# Patient Record
Sex: MALE | Race: WHITE | NOT HISPANIC OR LATINO | Employment: OTHER | URBAN - METROPOLITAN AREA
[De-identification: names, ages, dates, MRNs, and addresses within clinical notes are randomized per-mention and may not be internally consistent; named-entity substitution may affect disease eponyms.]

---

## 2017-10-25 PROBLEM — G45.9 TIA (TRANSIENT ISCHEMIC ATTACK): Status: ACTIVE | Noted: 2017-10-25

## 2017-10-26 PROBLEM — R51.9 HEADACHE: Status: ACTIVE | Noted: 2017-10-26

## 2017-10-27 PROBLEM — R51.9 HEADACHE: Status: RESOLVED | Noted: 2017-10-26 | Resolved: 2017-10-27

## 2017-10-27 PROBLEM — G45.9 TIA (TRANSIENT ISCHEMIC ATTACK): Status: RESOLVED | Noted: 2017-10-25 | Resolved: 2017-10-27

## 2017-11-27 ENCOUNTER — HOSPITAL ENCOUNTER (OUTPATIENT)
Dept: SLEEP MEDICINE | Facility: HOSPITAL | Age: 72
Discharge: HOME | End: 2017-11-27
Attending: INTERNAL MEDICINE
Payer: MEDICARE

## 2017-11-27 DIAGNOSIS — G47.33 OBSTRUCTIVE SLEEP APNEA SYNDROME: ICD-10-CM

## 2017-11-27 PROCEDURE — 95806 SLEEP STUDY UNATT&RESP EFFT: CPT

## 2018-01-17 PROBLEM — I48.19 PERSISTENT ATRIAL FIBRILLATION: Status: ACTIVE | Noted: 2018-01-17

## 2018-01-17 PROBLEM — R94.39 ABNORMAL MYOCARDIAL PERFUSION STUDY: Status: ACTIVE | Noted: 2018-01-17

## 2018-01-17 PROBLEM — I25.10 CAD IN NATIVE ARTERY: Status: ACTIVE | Noted: 2018-01-17

## 2018-01-17 PROBLEM — I20.9 ANGINA, CLASS II: Status: ACTIVE | Noted: 2018-01-17

## 2018-01-20 PROBLEM — J18.9 PNEUMONIA OF RIGHT LUNG DUE TO INFECTIOUS ORGANISM: Status: ACTIVE | Noted: 2018-01-20

## 2018-01-21 PROBLEM — J11.1 INFLUENZA: Status: ACTIVE | Noted: 2018-01-21

## 2018-01-22 PROBLEM — E03.9 HYPOTHYROID: Status: ACTIVE | Noted: 2018-01-22

## 2018-01-22 PROBLEM — N40.0 BPH (BENIGN PROSTATIC HYPERPLASIA): Status: ACTIVE | Noted: 2018-01-22

## 2018-05-15 PROBLEM — M12.811 ROTATOR CUFF ARTHROPATHY, RIGHT: Status: ACTIVE | Noted: 2018-05-15

## 2018-06-07 PROBLEM — Z96.619 S/P REVERSE TOTAL SHOULDER ARTHROPLASTY: Status: ACTIVE | Noted: 2018-06-07

## 2021-07-09 PROBLEM — R29.898 COMPLAINTS OF WEAKNESS OF LOWER EXTREMITY: Status: ACTIVE | Noted: 2021-07-09

## 2021-07-09 PROBLEM — E87.6 HYPOKALEMIA: Status: ACTIVE | Noted: 2021-07-09

## 2021-07-09 PROBLEM — M79.674 PAIN IN RIGHT TOE(S): Status: ACTIVE | Noted: 2021-07-09

## 2021-07-10 PROBLEM — S92.501A: Status: ACTIVE | Noted: 2021-07-09

## 2021-07-10 PROBLEM — R26.89 IMBALANCE: Status: ACTIVE | Noted: 2021-07-10

## 2021-07-11 PROBLEM — R42 DIZZINESS: Status: ACTIVE | Noted: 2021-07-11

## 2021-07-11 PROBLEM — I95.9 HYPOTENSION: Status: ACTIVE | Noted: 2021-07-11

## 2022-09-02 ENCOUNTER — OFFICE VISIT (OUTPATIENT)
Dept: URGENT CARE | Facility: CLINIC | Age: 77
End: 2022-09-02
Payer: MEDICARE

## 2022-09-02 VITALS
HEART RATE: 81 BPM | BODY MASS INDEX: 35.94 KG/M2 | RESPIRATION RATE: 20 BRPM | WEIGHT: 280 LBS | TEMPERATURE: 99 F | SYSTOLIC BLOOD PRESSURE: 97 MMHG | HEIGHT: 74 IN | OXYGEN SATURATION: 95 % | DIASTOLIC BLOOD PRESSURE: 60 MMHG

## 2022-09-02 DIAGNOSIS — S61.412A LACERATION OF LEFT HAND WITHOUT FOREIGN BODY, INITIAL ENCOUNTER: Primary | ICD-10-CM

## 2022-09-02 PROCEDURE — 99204 PR OFFICE/OUTPT VISIT, NEW, LEVL IV, 45-59 MIN: ICD-10-PCS | Mod: 25,S$GLB,, | Performed by: FAMILY MEDICINE

## 2022-09-02 PROCEDURE — 12001 LACERATION REPAIR: ICD-10-PCS | Mod: S$GLB,,, | Performed by: FAMILY MEDICINE

## 2022-09-02 PROCEDURE — 73130 X-RAY EXAM OF HAND: CPT | Mod: LT,S$GLB,, | Performed by: RADIOLOGY

## 2022-09-02 PROCEDURE — 12001 RPR S/N/AX/GEN/TRNK 2.5CM/<: CPT | Mod: S$GLB,,, | Performed by: FAMILY MEDICINE

## 2022-09-02 PROCEDURE — 99204 OFFICE O/P NEW MOD 45 MIN: CPT | Mod: 25,S$GLB,, | Performed by: FAMILY MEDICINE

## 2022-09-02 PROCEDURE — 73130 XR HAND COMPLETE 3 VIEW LEFT: ICD-10-PCS | Mod: LT,S$GLB,, | Performed by: RADIOLOGY

## 2022-09-02 RX ORDER — MUPIROCIN 20 MG/G
OINTMENT TOPICAL 2 TIMES DAILY
Qty: 30 G | Refills: 0 | Status: SHIPPED | OUTPATIENT
Start: 2022-09-02

## 2022-09-02 RX ORDER — PREDNISONE 10 MG/1
TABLET ORAL
COMMUNITY
Start: 2022-08-26

## 2022-09-02 RX ORDER — COLCHICINE 0.6 MG/1
TABLET ORAL
COMMUNITY
Start: 2022-08-26

## 2022-09-02 RX ORDER — CEPHALEXIN 250 MG/1
250 CAPSULE ORAL 4 TIMES DAILY
Qty: 40 CAPSULE | Refills: 0 | Status: SHIPPED | OUTPATIENT
Start: 2022-09-02 | End: 2022-09-12

## 2022-09-02 RX ORDER — GABAPENTIN 100 MG/1
200 CAPSULE ORAL 3 TIMES DAILY
COMMUNITY
Start: 2022-03-07

## 2022-09-02 NOTE — PROCEDURES
Laceration Repair    Date/Time: 9/2/2022 4:30 PM  Performed by: Juma Antonio MD  Authorized by: Juma Antonio MD   Body area: upper extremity  Location details: left hand  Laceration length: 2.5 cm  Foreign bodies: no foreign bodies  Tendon involvement: none  Nerve involvement: none  Vascular damage: no  Anesthesia: local infiltration    Anesthesia:  Local Anesthetic: lidocaine 1% without epinephrine  Anesthetic total: 3 mL  Irrigation solution: saline  Irrigation method: syringe  Amount of cleaning: standard  Debridement: none  Degree of undermining: none  Skin closure: 5-0 Prolene  Number of sutures: 6  Technique: simple  Approximation: close  Approximation difficulty: simple  Dressing: 4x4 sterile gauze, antibiotic ointment and dressing applied  Patient tolerance: Patient tolerated the procedure well with no immediate complications  Comments: Td up to date.

## 2022-09-02 NOTE — PATIENT INSTRUCTIONS
Please drink plenty of fluids.  Please get plenty of rest.  Please return here or go to the Emergency Department for any concerns or worsening of condition.  If you were prescribed antibiotics, please take them to completion.  If you were prescribed a narcotic medication, do not drive or operate heavy equipment or machinery while taking these medications.      If not allergic, please take over the counter Tylenol (Acetaminophen) and/or Motrin (Ibuprofen) as directed for control of pain and/or fever.    Your tetanus was brought up to date if needed.    Keep wound clean and dry.  You may use a plastic bag to keep area dry while showering    Clean wound once or twice a day with soap and water, then apply antibiotic ointment and redress wound.    Follow up here or your primary care doctor for suture removal in    7 - 10 days for lacerations on your body    5 - 7 days for lacerations on your face.    Please follow up with your primary care doctor or specialist as needed.  Jake Rivera MD  [phone]    You must understand that you have received treatment at an Urgent Care facility only, and that you may be  released before all of your medical problems are known or treated. Urgent Care facilities are not equipped to  handle life threatening emergencies. It is recommended that you seek care at an Emergency Department for  further evaluation of worsening or concerning symptoms, or possibly life threatening conditions as  Discussed.    Laceration: All Closures  A laceration is a cut through the skin. This will usually require stitches (sutures) or staples if it is deep. Minor cuts may be treated with a surgical tape closure or skin glue.    Home care  Your healthcare provider may prescribe an antibiotic. This is to help prevent infection. Follow all instructions for taking this medicine. Take the medicine every day until it is gone or you are told to stop. You should not have any left over.  The healthcare provider  may prescribe medicines for pain. Follow instructions for taking them.  Follow the healthcare providers instructions on how to care for the cut.  Keep the wound clean and dry. Do not get the wound wet until you are told it is okay to do so. If the area gets wet, gently pat it dry with a clean cloth. Replace the wet bandage with a dry one.  If a bandage was applied and it becomes wet or dirty, replace it. Otherwise, leave it in place for the first 24 hours.  Caring for sutures or staples: Once you no longer need to keep them dry, clean the wound daily. First, remove the bandage. Then wash the area gently with soap and warm water, or as directed by the health care provider. Use a wet cotton swab to loosen and remove any blood or crust that forms. After cleaning, apply a thin layer of antibiotic ointment if advised. Then put on a new bandage unless you are told not to.  Caring for skin glue: Dont put apply liquid, ointment, or cream on the wound while the glue is in place. Avoid activities that cause heavy sweating. Protect the wound from sunlight. Do not scratch, rub, or pick at the adhesive film. Do not place tape directly over the film. The glue should peel off within 5 to 10 days.   Caring for surgical tape: Keep the area dry. If it gets wet, blot it dry with a clean towel. Surgical tape usually falls off within 7 to 10 days. If it has not fallen off after 10 days, you can take it off yourself. Put mineral oil or petroleum jelly on a cotton ball and gently rub the tape until it is removed.  Once you can get the wound wet, you may shower as usual but do not soak the wound in water (no tub baths or swimming)  Even with proper treatment, a wound infection may sometimes occur. Check the wound daily for signs of infection listed below.  Scalp wounds  During the first two days, you may carefully rinse your hair in the shower to remove blood, glass or dirt particles. After two days, you may shower and shampoo your hair  normally. Do not soak your scalp in the tub or go swimming until the stitches or staples have been removed. Talk with your healthcare provider before applying any antibiotic ointment to the wound.  Mouth wounds  Eat soft foods to reduce pain. If the cut is inside of your mouth, clean by rinsing after each meal and at bedtime with a mixture of equal parts water and hydrogen peroxide (do not swallow!). Or, you can use a cotton swab to directly apply hydrogen peroxide onto the cut. Mouth wounds can be painful when eating. You may use an over-the-counter local numbing solution for pain relief. If this is not available, you may use any numbing solution intended for teething babies. You may apply this directly to the sores with a cotton-tip swab or with your finger.  Follow-up care  Follow up with your healthcare provider as advised. Ask your healthcare provider how long sutures should be left in place. Be sure to return for suture removal as directed. If dissolving stitches were used in the mouth, these should fall out or dissolve without the need for removal. If tape closures were used, remove them yourself when your provider recommends if they have not fallen off on their own. If skin glue was used, the film will wear off by itself.  When to seek medical advice  Call your healthcare provider right away if any of these occur:  Wound bleeding not controlled by direct pressure  Signs of infection, including increasing pain in the wound, increasing wound redness or swelling, or pus or bad odor coming from the wound  Fever of 100.4°F (38.ºC) or higher or as directed by your healthcare provider  Stitches or staples come apart or fall out or surgical tape falls off before 7 days  Wound edges re-open  Wound changes colors  Numbness around the wound   Decreased movement around the injured area  Date Last Reviewed: 6/14/2015  © 5223-2728 [employer]. [address] [place] [address], [place] [address]. All rights reserved.  This information is not intended as a substitute for professional medical care. Always follow your healthcare professional's instructions.

## 2022-09-02 NOTE — PROGRESS NOTES
"Subjective:       Patient ID: Mohan Chong Sr. is a 76 y.o. male.    Vitals:  height is 6' 2" (1.88 m) and weight is 127 kg (280 lb). His oral temperature is 98.5 °F (36.9 °C). His blood pressure is 97/60 and his pulse is 81. His respiration is 20 and oxygen saturation is 95%.     Chief Complaint: Laceration (Left hand)    Patient was cleaning some blades for  and it got away from him and got him on his left hand.     Laceration   The incident occurred less than 1 hour ago. The laceration is located on the Left hand. The laceration mechanism was a metal edge. The pain is at a severity of 5/10. The pain is mild. The pain has been Constant since onset. It is unknown if a foreign body is present. His tetanus status is UTD.     Constitution: Negative.   HENT: Negative.     Cardiovascular: Negative.    Eyes: Negative.    Respiratory: Negative.     Gastrointestinal: Negative.    Endocrine: negative.   Genitourinary: Negative.    Musculoskeletal: Negative.    Skin: Negative.  Positive for laceration.   Allergic/Immunologic: Negative.    Neurological: Negative.    Hematologic/Lymphatic: Negative.    Psychiatric/Behavioral: Negative.       Objective:      Physical Exam   Constitutional: He is oriented to person, place, and time. He appears well-developed. He is cooperative.  Non-toxic appearance. He does not appear ill. No distress.   HENT:   Head: Normocephalic and atraumatic. Head is without abrasion, without contusion and without laceration.   Ears:   Right Ear: Hearing, tympanic membrane, external ear and ear canal normal. No hemotympanum.   Left Ear: Hearing, tympanic membrane, external ear and ear canal normal. No hemotympanum.   Nose: Nose normal. No mucosal edema, rhinorrhea or nasal deformity. No epistaxis. Right sinus exhibits no maxillary sinus tenderness and no frontal sinus tenderness. Left sinus exhibits no maxillary sinus tenderness and no frontal sinus tenderness.   Mouth/Throat: Uvula is midline, " oropharynx is clear and moist and mucous membranes are normal. No trismus in the jaw. Normal dentition. No uvula swelling. No posterior oropharyngeal erythema.   Eyes: Conjunctivae, EOM and lids are normal. Pupils are equal, round, and reactive to light. Right eye exhibits no discharge. Left eye exhibits no discharge. No scleral icterus.   Neck: Trachea normal and phonation normal. Neck supple. No tracheal deviation present. No neck rigidity present. No spinous process tenderness present. No muscular tenderness present.   Cardiovascular: Normal rate, regular rhythm, normal heart sounds and normal pulses.   Pulmonary/Chest: Effort normal and breath sounds normal. No respiratory distress.   Abdominal: Normal appearance and bowel sounds are normal. He exhibits no distension and no mass. Soft. There is no abdominal tenderness.   Musculoskeletal: Normal range of motion.         General: No deformity. Normal range of motion.      Left hand: He exhibits laceration.        Hands:    Neurological: He is alert and oriented to person, place, and time. He has normal strength. No cranial nerve deficit or sensory deficit. He exhibits normal muscle tone. He displays no seizure activity. Coordination normal. GCS eye subscore is 4. GCS verbal subscore is 5. GCS motor subscore is 6.   Skin: Skin is warm, dry, intact, not diaphoretic and not pale. Capillary refill takes less than 2 seconds. Lacerations - upper ext.:  left handNo abrasion, No burn, No bruising and No ecchymosis   Psychiatric: His speech is normal and behavior is normal. Judgment and thought content normal.   Nursing note and vitals reviewed.      Type of Interpretation: ED Physician (Independently Interpreted).  Radiology Procedure Done: Left Hand.  Interpretation: No fx, foreign body in 5th finger (unrelated to laceration injury.)          Assessment:       1. Laceration of left hand without foreign body, initial encounter            Plan:         Laceration of left  hand without foreign body, initial encounter  -     XR HAND COMPLETE 3 VIEW LEFT; Future; Expected date: 09/02/2022  -     cephALEXin (KEFLEX) 250 MG capsule; Take 1 capsule (250 mg total) by mouth 4 (four) times daily. for 10 days  Dispense: 40 capsule; Refill: 0  -     mupirocin (BACTROBAN) 2 % ointment; Apply topically 2 (two) times daily.  Dispense: 30 g; Refill: 0  -     Laceration Repair        Please drink plenty of fluids.  Please get plenty of rest.  Please return here or go to the Emergency Department for any concerns or worsening of condition.  If you were prescribed antibiotics, please take them to completion.  If you were prescribed a narcotic medication, do not drive or operate heavy equipment or machinery while taking these medications.      If not allergic, please take over the counter Tylenol (Acetaminophen) and/or Motrin (Ibuprofen) as directed for control of pain and/or fever.    Your tetanus was brought up to date if needed.    Keep wound clean and dry.  You may use a plastic bag to keep area dry while showering    Clean wound once or twice a day with soap and water, then apply antibiotic ointment and redress wound.    Follow up here or your primary care doctor for suture removal in    7 - 10 days for lacerations on your body    5 - 7 days for lacerations on your face.      Please follow up with your primary care doctor or specialist as needed.  Jake Rivera MD  [phone]    You must understand that you have received treatment at an Urgent Care facility only, and that you may be  released before all of your medical problems are known or treated. Urgent Care facilities are not equipped to  handle life threatening emergencies. It is recommended that you seek care at an Emergency Department for  further evaluation of worsening or concerning symptoms, or possibly life threatening conditions as  discussed.

## 2022-09-02 NOTE — LETTER
September 2, 2022  Mohan Chong Sr.  [address]  [place] [address]                [place] [facility]  [address], [address]  [place] [address]  Phone: [phone]  Fax: [phone] Mohan Chong was seen and treated in our Urgent Care department on 9/2/2022. He may return to work in 2 - 3 days.      If you have any questions or concerns, please don't hesitate to call.        Sincerely,        Juma Antonio MD

## 2023-06-14 ENCOUNTER — OFFICE VISIT (OUTPATIENT)
Dept: URGENT CARE | Facility: CLINIC | Age: 78
End: 2023-06-14
Payer: MEDICARE

## 2023-06-14 VITALS
TEMPERATURE: 98 F | OXYGEN SATURATION: 96 % | RESPIRATION RATE: 18 BRPM | HEIGHT: 74 IN | BODY MASS INDEX: 35.29 KG/M2 | DIASTOLIC BLOOD PRESSURE: 64 MMHG | HEART RATE: 62 BPM | SYSTOLIC BLOOD PRESSURE: 95 MMHG | WEIGHT: 275 LBS

## 2023-06-14 DIAGNOSIS — B96.89 ACUTE BACTERIAL BRONCHITIS: Primary | ICD-10-CM

## 2023-06-14 DIAGNOSIS — J20.8 ACUTE BACTERIAL BRONCHITIS: Primary | ICD-10-CM

## 2023-06-14 PROCEDURE — 99213 PR OFFICE/OUTPT VISIT, EST, LEVL III, 20-29 MIN: ICD-10-PCS | Mod: S$GLB,,,

## 2023-06-14 PROCEDURE — 99213 OFFICE O/P EST LOW 20 MIN: CPT | Mod: S$GLB,,,

## 2023-06-14 RX ORDER — BENZONATATE 200 MG/1
200 CAPSULE ORAL 3 TIMES DAILY PRN
Qty: 21 CAPSULE | Refills: 0 | Status: SHIPPED | OUTPATIENT
Start: 2023-06-14 | End: 2023-06-21

## 2023-06-14 RX ORDER — AZITHROMYCIN 250 MG/1
TABLET, FILM COATED ORAL
Qty: 6 TABLET | Refills: 0 | Status: SHIPPED | OUTPATIENT
Start: 2023-06-14 | End: 2023-06-19

## 2023-06-14 RX ORDER — ATORVASTATIN CALCIUM 20 MG/1
20 TABLET, FILM COATED ORAL
COMMUNITY
Start: 2023-04-10

## 2023-06-14 RX ORDER — ALBUTEROL SULFATE 90 UG/1
2 AEROSOL, METERED RESPIRATORY (INHALATION) EVERY 6 HOURS PRN
Qty: 6.7 G | Refills: 0 | Status: SHIPPED | OUTPATIENT
Start: 2023-06-14

## 2023-06-14 RX ORDER — RAMELTEON 8 MG/1
8 TABLET ORAL
COMMUNITY
Start: 2023-05-31

## 2023-06-14 NOTE — PROGRESS NOTES
"Subjective:      Patient ID: Mohan Chong Sr. is a 77 y.o. male.    Vitals:  height is 6' 2" (1.88 m) and weight is 124.7 kg (275 lb). His oral temperature is 98 °F (36.7 °C). His blood pressure is 95/64 and his pulse is 62. His respiration is 18 and oxygen saturation is 96%.     Chief Complaint: Cough    Pt states x2-3 days he has had a cough, congestion, and sore throat.    Cough  This is a new problem. The current episode started in the past 7 days. The problem has been gradually worsening. The problem occurs every few minutes. The cough is Non-productive. Associated symptoms include a sore throat. Pertinent negatives include no ear congestion, ear pain, fever, headaches, nasal congestion, postnasal drip or rhinorrhea. Nothing aggravates the symptoms. Risk factors: none. Treatments tried: Thuraflu. The treatment provided moderate relief. There is no history of asthma, COPD or emphysema.     Constitution: Negative for fever.   HENT:  Positive for congestion and sore throat. Negative for ear pain and postnasal drip.    Respiratory:  Positive for cough. Negative for sputum production.    Neurological:  Negative for headaches.    Objective:     Physical Exam   Constitutional: He is oriented to person, place, and time. He appears well-developed. He is cooperative.  Non-toxic appearance. He does not appear ill. No distress.   HENT:   Head: Normocephalic and atraumatic.   Ears:   Right Ear: Hearing, external ear and ear canal normal.   Left Ear: Hearing, external ear and ear canal normal.      Comments: Left TM has suppurative fluid present. Right TM has serous fluid.  Nose: Nose normal. No mucosal edema, rhinorrhea or nasal deformity. No epistaxis. Right sinus exhibits no maxillary sinus tenderness and no frontal sinus tenderness. Left sinus exhibits no maxillary sinus tenderness and no frontal sinus tenderness.   Mouth/Throat: Uvula is midline and mucous membranes are normal. Mucous membranes are moist. No trismus " in the jaw. Normal dentition. No uvula swelling. Posterior oropharyngeal erythema present. No oropharyngeal exudate or posterior oropharyngeal edema.   Eyes: Conjunctivae and lids are normal. No scleral icterus.   Neck: Trachea normal and phonation normal. Neck supple. No edema present. No erythema present. No neck rigidity present.   Cardiovascular: Normal rate.   Pulmonary/Chest: Effort normal and breath sounds normal. No respiratory distress. He has no decreased breath sounds. He has no wheezes.         Comments: Patient is able to talk in complete sentences. No accessory muscle use or tripoding.     Abdominal: Normal appearance.   Musculoskeletal: Normal range of motion.         General: No deformity. Normal range of motion.   Neurological: He is alert and oriented to person, place, and time. He exhibits normal muscle tone. Coordination normal.   Skin: Skin is warm, dry, intact, not diaphoretic and not pale.   Psychiatric: His speech is normal and behavior is normal. Judgment and thought content normal.   Nursing note and vitals reviewed.    Assessment:     1. Acute bacterial bronchitis        Plan:       Acute bacterial bronchitis  -     azithromycin (Z-KANDY) 250 MG tablet; Take 2 tablets by mouth on day 1; Take 1 tablet by mouth on days 2-5  Dispense: 6 tablet; Refill: 0  -     albuterol (PROVENTIL HFA) 90 mcg/actuation inhaler; Inhale 2 puffs into the lungs every 6 (six) hours as needed for Wheezing or Shortness of Breath. Rescue  Dispense: 6.7 g; Refill: 0  -     benzonatate (TESSALON) 200 MG capsule; Take 1 capsule (200 mg total) by mouth 3 (three) times daily as needed for Cough.  Dispense: 21 capsule; Refill: 0      VS stable, afebrile. Patient appears well and is ambulating in clinic with ease. Declined covid, flu testing in clinic.   Please drink plenty of fluids. Please get plenty of rest.  Please return here or go to the Emergency Department for any concerns or worsening of condition.  If you were  prescribed antibiotics, please take them to completion.  Patient Instructions   1.  Take all medications as directed. If you have been prescribed antibiotics, make sure to complete them.   2.  Rest and keep yourself/patient well hydrated. For adults, it is recommended to drink at least 8-10 glasses of water daily.   3.  For patients above 6 months of age who are not allergic to and are not on anticoagulants, you can alternate Tylenol and Motrin every 4-6 hours for fever above 100.4F and/or pain.  For patients less than 6 months of age, allergic to or intolerant to NSAIDS, have gastritis, gastric ulcers, or history of GI bleeds, are pregnant, or are on anticoagulant therapy, you can take Tylenol every 4 hours as needed for fever above 100.4F and/or pain.   4. You should schedule a follow-up appointment with your Primary Care Provider/Pediatrician for recheck in 2-3 days or as directed at this visit.   5.  If your condition fails to improve in a timely manner, you should receive another evaluation by your Primary Care Provider/Pediatrician to discuss your concerns or return to urgent care for a recheck.  If your condition worsens at any time, you should report immediately to your nearest Emergency Department for further evaluation. **You must understand that you have received Urgent Care treatment only and that you may be released before all of your medical problems are known or treated. You, the patient, are responsible to arrange for follow-up care as instructed.

## 2024-01-22 ENCOUNTER — OFFICE VISIT (OUTPATIENT)
Dept: UROLOGY | Facility: CLINIC | Age: 79
End: 2024-01-22
Attending: SPECIALIST
Payer: MEDICARE

## 2024-01-22 VITALS
DIASTOLIC BLOOD PRESSURE: 62 MMHG | SYSTOLIC BLOOD PRESSURE: 108 MMHG | BODY MASS INDEX: 36.9 KG/M2 | WEIGHT: 287.5 LBS | HEIGHT: 74 IN | HEART RATE: 71 BPM | OXYGEN SATURATION: 98 %

## 2024-01-22 DIAGNOSIS — N13.8 BENIGN PROSTATIC HYPERPLASIA WITH URINARY OBSTRUCTION: Primary | ICD-10-CM

## 2024-01-22 DIAGNOSIS — N40.1 BENIGN PROSTATIC HYPERPLASIA WITH URINARY OBSTRUCTION: Primary | ICD-10-CM

## 2024-01-22 LAB — POC RESIDUAL URINE VOLUME: 58 ML (ref 0–100)

## 2024-01-22 PROCEDURE — 99999PBSHW POCT BLADDER SCAN: Mod: PBBFAC,,,

## 2024-01-22 PROCEDURE — 51798 US URINE CAPACITY MEASURE: CPT | Mod: PBBFAC | Performed by: SPECIALIST

## 2024-01-22 RX ORDER — TRAMADOL HYDROCHLORIDE 50 MG/1
50 TABLET ORAL EVERY 6 HOURS PRN
COMMUNITY
Start: 2023-12-15

## 2024-01-22 RX ORDER — TAMSULOSIN HYDROCHLORIDE 0.4 MG/1
0.4 CAPSULE ORAL DAILY
Qty: 30 CAPSULE | Refills: 11 | Status: SHIPPED | OUTPATIENT
Start: 2024-01-22 | End: 2025-01-21

## 2024-01-22 RX ORDER — NAPROXEN 500 MG/1
500 TABLET ORAL 2 TIMES DAILY
COMMUNITY
Start: 2023-12-21

## 2024-01-22 NOTE — PROGRESS NOTES
[facility] - Urology   Clinic Note    SUBJECTIVE:     Chief Complaint   Patient presents with    Urinary Frequency     States he's always urinating. Has seen other Urologist in the [place] and [place] area and states they haven't really done anything for him. States he had a recent hip replacement.        Referral from: Self, Aaareferral.    History of Present Illness:  Mohan Chong Sr. is a 78 y.o. male who presents to clinic for LUTS.    Pleasant 78 y.o. M with progressive LUTS.  He is not on an AB or 5ARI.  He might have been on tamsulosin years ago, unclear.  He is presently getting up every 1-2 hours at night to void.  He mentions a bladder scan was done and showed a minimal PVR.  He denies a hx of retention, UTI, or hematuria.  IPPS score pending.    Patient endorses no additional complaints at this time.    Past Medical History:   Diagnosis Date    A-fib     Angina, class II     Anticoagulant long-term use     Anxiety     Arthritis     Anthony's esophagus     Anthony's esophagus     Cancer     SKIN CANCER    Coronary artery disease     Depression     Encounter for blood transfusion     GERD (gastroesophageal reflux disease)     History of bilateral knee replacement     History of right hip replacement     HLD (hyperlipidemia)     Hypertension     Stroke     Thyroid disease     TIA (transient ischemic attack)        Past Surgical History:   Procedure Laterality Date    BONE GRAFT Right 6/6/2018    Procedure: BONE GRAFT PROXIMAL HUMERUS; BONE GRAFTING GLENOID;  Surgeon: Mani Salgado MD;  Location: [facility] OR;  Service: Orthopedics;  Laterality: Right;    FACIAL SX      SKIN CANCER    FIXATION OF TENDON Right 6/6/2018    Procedure: TENODESIS (TENDON FIXATION) PROXIMAL BICEP;  Surgeon: Mani Salgado MD;  Location: [facility] OR;  Service: Orthopedics;  Laterality: Right;    INTERNAL NEUROLYSIS USING OPERATING MICROSCOPE Right 6/6/2018    Procedure: NEUROLYSIS AXILLARY NERVE;  Surgeon: Mani FLOREZ  MD Damian;  Location: [facility] OR;  Service: Orthopedics;  Laterality: Right;    JOINT REPLACEMENT Bilateral     ,knees and hip    NV INS STABLIZATION DEV W/O DECOMPRN, LUMBAR; SINGLE LEVEL N/A 2022    Procedure: FIXATION OF LUMBAR SPINE USING INTERSPINOUS PROCESS DEVICE, VERTIFLEX L3/4, L4/5;  Surgeon: Octavio Martinez MD;  Location: [facility] OR;  Service: Pain Management;  Laterality: N/A;    NV TOTAL KNEE ARTHROPLASTY Bilateral     Knee Replacement, Total    REVERSE TOTAL SHOULDER ARTHROPLASTY Right 2018    Procedure: ARTHROPLASTY-SHOULDER-REVERSE;  Surgeon: Mani Salgado MD;  Location: [facility] OR;  Service: Orthopedics;  Laterality: Right;    TOTAL HIP ARTHROPLASTY Right        Family History   Family history unknown: Yes       Social History     Tobacco Use    Smoking status: Former     Current packs/day: 0.00     Types: Cigarettes     Quit date:      Years since quittin.0     Passive exposure: Past    Smokeless tobacco: Never   Substance Use Topics    Alcohol use: Not Currently    Drug use: Not Currently     Types: Marijuana       Current Outpatient Medications on File Prior to Visit   Medication Sig Dispense Refill    albuterol (PROVENTIL HFA) 90 mcg/actuation inhaler Inhale 2 puffs into the lungs every 6 (six) hours as needed for Wheezing or Shortness of Breath. Rescue 6.7 g 0    apixaban (ELIQUIS) 5 mg Tab Take 1 tablet (5 mg total) by mouth 2 (two) times daily.      aspirin (ECOTRIN) 81 MG EC tablet Take 81 mg by mouth once daily.      EScitalopram oxalate (LEXAPRO) 10 MG tablet Take 10 mg by mouth once daily.      esomeprazole (NEXIUM) 40 MG capsule Take 40 mg by mouth once daily.      furosemide (LASIX) 20 MG tablet Take 20 mg by mouth 2 (two) times daily.      levothyroxine (SYNTHROID) 50 MCG tablet Take 1 tablet (50 mcg total) by mouth before breakfast. 30 tablet 11    metOLazone (ZAROXOLYN) 5 MG tablet Take 5 mg by mouth once daily.      naproxen (NAPROSYN) 500 MG tablet Take 500 mg by  "mouth 2 (two) times daily.      potassium chloride SA (K-DUR,KLOR-CON) 20 MEQ tablet Take 1 tablet (20 mEq total) by mouth once daily. 30 tablet 0    rOPINIRole (REQUIP) 1 MG tablet Take 1 mg by mouth every evening.      atorvastatin (LIPITOR) 20 MG tablet Take 20 mg by mouth.      colchicine (COLCRYS) 0.6 mg tablet Take by mouth.      gabapentin (NEURONTIN) 100 MG capsule Take 200 mg by mouth 3 (three) times daily.      mupirocin (BACTROBAN) 2 % ointment Apply topically 2 (two) times daily. (Patient not taking: Reported on 6/14/2023) 30 g 0    predniSONE (DELTASONE) 10 MG tablet Take by mouth.      ramelteon (ROZEREM) 8 mg tablet Take 8 mg by mouth.      traMADoL (ULTRAM) 50 mg tablet Take 50 mg by mouth every 6 (six) hours as needed.       No current facility-administered medications on file prior to visit.       Review of patient's allergies indicates:   Allergen Reactions    Penicillins        ROS     Review of Systems:  A review of 10+ systems was conducted with pertinent positive and negative findings documented in HPI with all other systems reviewed and negative.    OBJECTIVE:     Estimated body mass index is 36.91 kg/m² as calculated from the following:    Height as of this encounter: 6' 2" (1.88 m).    Weight as of this encounter: 130.4 kg (287 lb 7.7 oz).    Vital Signs (Most Recent)  Vitals:    01/22/24 1432   BP: 108/62   Pulse: 71       Physical Exam:    Physical Exam     GENERAL: patient sitting comfortably  NEURO: grossly normal with no focal deficits  PSYCH: appropriate mood and affect    Genitourinary Exam:  deferred      LABS:     Lab Results   Component Value Date    BUN 31 (H) 06/24/2022    CREATININE 0.84 06/24/2022    WBC 6.90 11/20/2023    HGB 12.4 (L) 11/20/2023    HCT 37.8 (L) 11/20/2023     11/20/2023    AST 56 04/26/2022    ALT 25 04/26/2022    ALKPHOS 45 04/26/2022    ALBUMIN 4.0 04/26/2022    HGBA1C 5.7 10/27/2017    INR 1.4 (H) 01/20/2018        Urinalysis:   No results found " "for: "UAREFLEX"     PSA:  Lab Results   Component Value Date    PSA 0.54 04/26/2022    PSADIAG 0.36 11/15/2023       Testosterone:  No results found for: "TOTALTESTOST", "TESTOSTERONE"     Imaging:  I have personally reviewed all relevant imaging studies.    No results found for this or any previous visit (from the past 2160 hour(s)).  No results found for this or any previous visit (from the past 2160 hour(s)).  XR HAND COMPLETE 3 VIEW LEFT  Narrative: EXAMINATION:  XR HAND COMPLETE 3 VIEW LEFT    CLINICAL HISTORY:  Laceration without foreign body of left hand, initial encounter    TECHNIQUE:  PA, lateral, and oblique views of the left hand were performed.    COMPARISON:  None    FINDINGS:  The bone mineralization is within normal limits.  There is no cortical step-off.  There is no evidence of periostitis.    There is advanced joint space narrowing of the base of the 1st metacarpal.  There is also joint space narrowing throughout the remainder of the carpal bones.    There is a punctate radiopaque density adjacent to the 5th proximal interphalangeal joint.  No associated soft tissue gas is identified.  Impression: Small punctate radiopaque foreign body adjacent to the 5th PIP joint.  No associated fracture.    This report was flagged in Epic as abnormal.    Electronically signed by: Xavi Warner MD  Date:    09/02/2022  Time:    17:27         ASSESSMENT     1. Benign prostatic hyperplasia with urinary obstruction        PLAN:     Rx tamsulosin  Uroflow/bladder scan  RTC 2 wks    Octavio Hughes MD  Urology  [facility]     Disclaimer: This note has been generated using voice-recognition software. There may be typographical errors that have been missed during proof-reading.     "

## 2024-02-05 ENCOUNTER — OFFICE VISIT (OUTPATIENT)
Dept: UROLOGY | Facility: CLINIC | Age: 79
End: 2024-02-05
Attending: SPECIALIST
Payer: MEDICARE

## 2024-02-05 VITALS
HEART RATE: 71 BPM | RESPIRATION RATE: 20 BRPM | SYSTOLIC BLOOD PRESSURE: 110 MMHG | HEIGHT: 74 IN | BODY MASS INDEX: 36.84 KG/M2 | OXYGEN SATURATION: 100 % | WEIGHT: 287.06 LBS | DIASTOLIC BLOOD PRESSURE: 68 MMHG

## 2024-02-05 DIAGNOSIS — N40.1 BPH ASSOCIATED WITH NOCTURIA: Primary | ICD-10-CM

## 2024-02-05 DIAGNOSIS — R35.1 BPH ASSOCIATED WITH NOCTURIA: Primary | ICD-10-CM

## 2024-02-05 RX ORDER — FINASTERIDE 5 MG/1
5 TABLET, FILM COATED ORAL DAILY
Qty: 30 TABLET | Refills: 11 | Status: SHIPPED | OUTPATIENT
Start: 2024-02-05 | End: 2025-02-04

## 2024-02-05 RX ORDER — SOLIFENACIN SUCCINATE 5 MG/1
5 TABLET, FILM COATED ORAL DAILY
Qty: 30 TABLET | Refills: 11 | Status: SHIPPED | OUTPATIENT
Start: 2024-02-05 | End: 2025-02-04

## 2024-02-05 NOTE — PROGRESS NOTES
[facility] - Urology   Clinic Note    SUBJECTIVE:     No chief complaint on file.      Referral from: Self, James.    History of Present Illness:  Mohan Chong  is a 78 y.o. male who presents to clinic for BPH and detrusor overactivity.    Marginal response to tamsulosin 0.4 mg.  Denies dizziness.  Not on a 5ARI, was likely on tamsulosin in the past.    Repeat Bladder Scan: 65 cc     Past Medical History:   Diagnosis Date    A-fib     Angina, class II     Anticoagulant long-term use     Anxiety     Arthritis     Anthony's esophagus     Anthony's esophagus     Cancer     SKIN CANCER    Coronary artery disease     Depression     Encounter for blood transfusion     GERD (gastroesophageal reflux disease)     History of bilateral knee replacement     History of right hip replacement     HLD (hyperlipidemia)     Hypertension     Stroke     Thyroid disease     TIA (transient ischemic attack)        Current Outpatient Medications on File Prior to Visit   Medication Sig Dispense Refill    albuterol (PROVENTIL HFA) 90 mcg/actuation inhaler Inhale 2 puffs into the lungs every 6 (six) hours as needed for Wheezing or Shortness of Breath. Rescue 6.7 g 0    apixaban (ELIQUIS) 5 mg Tab Take 1 tablet (5 mg total) by mouth 2 (two) times daily.      aspirin (ECOTRIN) 81 MG EC tablet Take 81 mg by mouth once daily.      atorvastatin (LIPITOR) 20 MG tablet Take 20 mg by mouth.      colchicine (COLCRYS) 0.6 mg tablet Take by mouth.      EScitalopram oxalate (LEXAPRO) 10 MG tablet Take 10 mg by mouth once daily.      esomeprazole (NEXIUM) 40 MG capsule Take 40 mg by mouth once daily.      furosemide (LASIX) 20 MG tablet Take 20 mg by mouth 2 (two) times daily.      gabapentin (NEURONTIN) 100 MG capsule Take 200 mg by mouth 3 (three) times daily.      levothyroxine (SYNTHROID) 50 MCG tablet Take 1 tablet (50 mcg total) by mouth before breakfast. 30 tablet 11    metOLazone (ZAROXOLYN) 5 MG tablet Take 5 mg by mouth  "once daily.      mupirocin (BACTROBAN) 2 % ointment Apply topically 2 (two) times daily. 30 g 0    naproxen (NAPROSYN) 500 MG tablet Take 500 mg by mouth 2 (two) times daily.      potassium chloride SA (K-DUR,KLOR-CON) 20 MEQ tablet Take 1 tablet (20 mEq total) by mouth once daily. 30 tablet 0    predniSONE (DELTASONE) 10 MG tablet Take by mouth.      ramelteon (ROZEREM) 8 mg tablet Take 8 mg by mouth.      rOPINIRole (REQUIP) 1 MG tablet Take 1 mg by mouth every evening.      tamsulosin (FLOMAX) 0.4 mg Cap Take 1 capsule (0.4 mg total) by mouth once daily. 30 capsule 11    traMADoL (ULTRAM) 50 mg tablet Take 50 mg by mouth every 6 (six) hours as needed.       No current facility-administered medications on file prior to visit.         OBJECTIVE:     Estimated body mass index is 36.85 kg/m² as calculated from the following:    Height as of this encounter: 6' 2" (1.88 m).    Weight as of this encounter: 130.2 kg (287 lb 0.6 oz).    Vital Signs (Most Recent)  Vitals:    02/05/24 1533   BP: 110/68   Pulse: 71   Resp: 20       LABS:     Lab Results   Component Value Date    BUN 31 (H) 06/24/2022    CREATININE 0.84 06/24/2022    WBC 6.90 11/20/2023    HGB 12.4 (L) 11/20/2023    HCT 37.8 (L) 11/20/2023     11/20/2023    AST 56 04/26/2022    ALT 25 04/26/2022    ALKPHOS 45 04/26/2022    ALBUMIN 4.0 04/26/2022    HGBA1C 5.7 10/27/2017    INR 1.4 (H) 01/20/2018        Urinalysis:   No results found for: "UAREFLEX"     PSA:  Lab Results   Component Value Date    PSA 0.54 04/26/2022    PSADIAG 0.36 11/15/2023       Testosterone:  No results found for: "TOTALTESTOST", "TESTOSTERONE"     Imaging:  I have personally reviewed all relevant imaging studies.    No results found for this or any previous visit (from the past 2160 hour(s)).  No results found for this or any previous visit (from the past 2160 hour(s)).  XR HAND COMPLETE 3 VIEW LEFT  Narrative: EXAMINATION:  XR HAND COMPLETE 3 VIEW LEFT    CLINICAL " HISTORY:  Laceration without foreign body of left hand, initial encounter    TECHNIQUE:  PA, lateral, and oblique views of the left hand were performed.    COMPARISON:  None    FINDINGS:  The bone mineralization is within normal limits.  There is no cortical step-off.  There is no evidence of periostitis.    There is advanced joint space narrowing of the base of the 1st metacarpal.  There is also joint space narrowing throughout the remainder of the carpal bones.    There is a punctate radiopaque density adjacent to the 5th proximal interphalangeal joint.  No associated soft tissue gas is identified.  Impression: Small punctate radiopaque foreign body adjacent to the 5th PIP joint.  No associated fracture.    This report was flagged in Epic as abnormal.    Electronically signed by: Xavi Warner MD  Date:    09/02/2022  Time:    17:27         ASSESSMENT     1. BPH associated with nocturia        PLAN:     Increast tamsulosin to 0.8 mg  Rx Vesicare 5 mg OD  RTC one month    Octavio Hughes MD  Urology  [facility]     Disclaimer: This note has been generated using voice-recognition software. There may be typographical errors that have been missed during proof-reading.

## 2024-02-26 PROBLEM — A49.8 PSEUDOMONAS AERUGINOSA INFECTION: Status: ACTIVE | Noted: 2024-02-26
